# Patient Record
Sex: MALE | Race: WHITE | NOT HISPANIC OR LATINO | Employment: OTHER | URBAN - METROPOLITAN AREA
[De-identification: names, ages, dates, MRNs, and addresses within clinical notes are randomized per-mention and may not be internally consistent; named-entity substitution may affect disease eponyms.]

---

## 2017-12-05 NOTE — PATIENT DISCUSSION
Recommended artificial tears to use: 1 drop 4x-6x a day in both eyes, Refresh Optive recommended brand.

## 2017-12-05 NOTE — PATIENT DISCUSSION
"Patient is to try [facility] 128 ointment (or am equivalent) at bedtime every night, use a 1/4"" ribbon. "

## 2017-12-19 NOTE — PATIENT DISCUSSION
"Patient is to try [facility] 128 ointment (or am equivalent) at bedtime every other night, use a 1/4"" ribbon. "

## 2017-12-19 NOTE — PATIENT DISCUSSION
Recommended artificial tears to use: 1 drop 4x a day in both eyes as needed, Refresh Optive recommended brand. Advised to take Omega 3 fatty acids, Flaxseed Oil, in supplements. 2-4 grams a day. Recommended warm compresses 10 minutes once a day.

## 2018-04-19 ENCOUNTER — ESTABLISHED COMPREHENSIVE EXAM (OUTPATIENT)
Dept: URBAN - METROPOLITAN AREA CLINIC 43 | Facility: CLINIC | Age: 68
End: 2018-04-19

## 2018-04-19 DIAGNOSIS — H25.89: ICD-10-CM

## 2018-04-19 PROCEDURE — 92014 COMPRE OPH EXAM EST PT 1/>: CPT

## 2018-04-19 PROCEDURE — G8785 BP SCRN NO PERF AT INTERVAL: HCPCS

## 2018-04-19 PROCEDURE — 92015 DETERMINE REFRACTIVE STATE: CPT

## 2018-04-19 PROCEDURE — 1036F TOBACCO NON-USER: CPT

## 2018-04-19 PROCEDURE — G8427 DOCREV CUR MEDS BY ELIG CLIN: HCPCS

## 2018-04-19 ASSESSMENT — TONOMETRY
OS_IOP_MMHG: 13
OD_IOP_MMHG: 15

## 2018-04-19 ASSESSMENT — VISUAL ACUITY
OS_SC: 20/60
OS_PH: 20/50+2
OD_PH: 20/40-2
OD_CC: J2
OD_SC: 20/60
OS_CC: J2
OS_BAT: 20/200
OS_SC: J12
OD_SC: J12
OD_BAT: 20/100

## 2018-07-12 NOTE — PATIENT DISCUSSION
"Patient is to try [facility] 128 ointment (or am equivalent) at bedtime as needed, use a 1/4"" ribbon. "

## 2018-07-18 NOTE — PATIENT DISCUSSION
"Patient is to try [facility] 128 ointment (or am equivalent) at bedtime as needed, use a 1/4"" ribbon. "

## 2018-07-18 NOTE — PROCEDURE NOTE: SURGICAL
Prior to commencing surgery patient identification, surgical procedure, site, and side were confirmed by Dr. Velvet Hall. Following topical proparacaine anesthesia, the patient was positioned at the YAG laser, a contact lens coupled to the cornea with methylcellulose and an axial posterior capsulotomy performed without complication using 2.8 Mj x 33. Excess methylcellulose was washed from the eye, one drop of Alphagan was instilled and the patient returned to the holding area having tolerated the procedure well and without complication. <br />[facility]:[id number]

## 2018-07-18 NOTE — PATIENT DISCUSSION
"Patient is to try [facility] 128 ointment (or am equivalent) at bedtime as needed, use a 1/4"" ribbon. "

## 2018-07-24 ENCOUNTER — CATARACT CONSULT (OUTPATIENT)
Dept: URBAN - METROPOLITAN AREA CLINIC 43 | Facility: CLINIC | Age: 68
End: 2018-07-24

## 2018-07-24 VITALS
SYSTOLIC BLOOD PRESSURE: 143 MMHG | HEIGHT: 60 IN | HEART RATE: 66 BPM | DIASTOLIC BLOOD PRESSURE: 87 MMHG | RESPIRATION RATE: 17 BRPM

## 2018-07-24 DIAGNOSIS — H04.123: ICD-10-CM

## 2018-07-24 DIAGNOSIS — H43.813: ICD-10-CM

## 2018-07-24 DIAGNOSIS — H25.812: ICD-10-CM

## 2018-07-24 DIAGNOSIS — H18.51: ICD-10-CM

## 2018-07-24 DIAGNOSIS — H25.811: ICD-10-CM

## 2018-07-24 PROCEDURE — G8427 DOCREV CUR MEDS BY ELIG CLIN: HCPCS

## 2018-07-24 PROCEDURE — G8952 PRE-HTN/HTN, NO F/U, NOT GVN: HCPCS

## 2018-07-24 PROCEDURE — 99214 OFFICE O/P EST MOD 30 MIN: CPT

## 2018-07-24 PROCEDURE — 1036F TOBACCO NON-USER: CPT

## 2018-07-24 PROCEDURE — 92136TC INTERFEROMETRY - TECHNICAL COMPONENT

## 2018-07-24 PROCEDURE — 4040F PNEUMOC VAC/ADMIN/RCVD: CPT

## 2018-07-24 PROCEDURE — 92134 CPTRZ OPH DX IMG PST SGM RTA: CPT

## 2018-07-24 PROCEDURE — 92025-2 CORNEAL TOPOGRAPHY, PT

## 2018-07-24 PROCEDURE — 92286 ANT SGM IMG I&R SPECLR MIC: CPT

## 2018-07-24 RX ORDER — DUREZOL 0.5 MG/ML: 1 EMULSION OPHTHALMIC TWICE A DAY

## 2018-07-24 RX ORDER — NEPAFENAC 3 MG/ML: 1 SUSPENSION/ DROPS OPHTHALMIC ONCE A DAY

## 2018-07-24 RX ORDER — MOXIFLOXACIN HYDROCHLORIDE 5 MG/ML: 1 SOLUTION/ DROPS OPHTHALMIC

## 2018-07-24 ASSESSMENT — VISUAL ACUITY
OD_CC: J1 OTC
OD_PAM: 20/20
OD_SC: J12
OD_BAT: 20/100
OD_SC: 20/50-1
OS_SC: J12
OS_SC: 20/50-1
OS_AM: 20/20
OS_BAT: 20/200
OS_CC: J2 OTC

## 2018-07-24 ASSESSMENT — TONOMETRY
OS_IOP_MMHG: 12
OD_IOP_MMHG: 14

## 2018-07-25 NOTE — PATIENT DISCUSSION
"Patient is to try [facility] 128 ointment (or am equivalent) at bedtime as needed, use a 1/4"" ribbon. "

## 2018-08-08 ENCOUNTER — SURGERY/PROCEDURE (OUTPATIENT)
Dept: URBAN - METROPOLITAN AREA CLINIC 43 | Facility: CLINIC | Age: 68
End: 2018-08-08

## 2018-08-08 DIAGNOSIS — H25.811: ICD-10-CM

## 2018-08-08 PROCEDURE — 66984AV REMOVE CATARACT, INSERT ADVANCED LENS

## 2018-08-09 ENCOUNTER — POST OP/EVAL OF SECOND EYE (OUTPATIENT)
Dept: URBAN - METROPOLITAN AREA CLINIC 43 | Facility: CLINIC | Age: 68
End: 2018-08-09

## 2018-08-09 DIAGNOSIS — H25.812: ICD-10-CM

## 2018-08-09 DIAGNOSIS — H04.123: ICD-10-CM

## 2018-08-09 DIAGNOSIS — Z96.1: ICD-10-CM

## 2018-08-09 PROCEDURE — G8785 BP SCRN NO PERF AT INTERVAL: HCPCS

## 2018-08-09 PROCEDURE — 4040F PNEUMOC VAC/ADMIN/RCVD: CPT

## 2018-08-09 PROCEDURE — 99024 POSTOP FOLLOW-UP VISIT: CPT

## 2018-08-09 PROCEDURE — G9903 PT SCRN TBCO ID AS NON USER: HCPCS

## 2018-08-09 PROCEDURE — 92012 INTRM OPH EXAM EST PATIENT: CPT

## 2018-08-09 PROCEDURE — G8483 FLU IMM NO ADMIN DOC REA: HCPCS

## 2018-08-09 PROCEDURE — G8428 CUR MEDS NOT DOCUMENT: HCPCS

## 2018-08-09 PROCEDURE — 1036F TOBACCO NON-USER: CPT

## 2018-08-09 ASSESSMENT — TONOMETRY
OS_IOP_MMHG: 18
OD_IOP_MMHG: 14

## 2018-08-09 ASSESSMENT — VISUAL ACUITY
OS_BAT: 20/200
OS_SC: 20/50
OD_SC: J5
OD_SC: 20/30-2

## 2018-08-15 ENCOUNTER — SURGERY/PROCEDURE (OUTPATIENT)
Dept: URBAN - METROPOLITAN AREA CLINIC 43 | Facility: CLINIC | Age: 68
End: 2018-08-15

## 2018-08-15 ENCOUNTER — TECH ONLY (OUTPATIENT)
Dept: URBAN - METROPOLITAN AREA CLINIC 39 | Facility: CLINIC | Age: 68
End: 2018-08-15

## 2018-08-15 DIAGNOSIS — H25.812: ICD-10-CM

## 2018-08-15 PROCEDURE — 65772LRI LRI DURING CAT SX

## 2018-08-15 PROCEDURE — 66984AV REMOVE CATARACT, INSERT ADVANCED LENS

## 2018-08-15 PROCEDURE — 66999LNSR LENSAR LASER FOR CAT SX

## 2018-08-15 ASSESSMENT — VISUAL ACUITY
OS_SC: 20/50-2
OS_SC: J12
OD_SC: 20/25-2

## 2018-08-15 ASSESSMENT — TONOMETRY
OS_IOP_MMHG: 12
OD_IOP_MMHG: 14

## 2018-08-16 ENCOUNTER — CATARACT POST-OP 1-DAY (OUTPATIENT)
Dept: URBAN - METROPOLITAN AREA CLINIC 43 | Facility: CLINIC | Age: 68
End: 2018-08-16

## 2018-08-16 DIAGNOSIS — Z96.1: ICD-10-CM

## 2018-08-16 PROCEDURE — 99024 POSTOP FOLLOW-UP VISIT: CPT

## 2018-08-16 ASSESSMENT — TONOMETRY
OD_IOP_MMHG: 17
OS_IOP_MMHG: 15

## 2018-08-16 ASSESSMENT — VISUAL ACUITY
OD_SC: 20/40+1
OS_PH: 20/40-1
OS_SC: 20/100-1
OS_SC: J12
OD_SC: J12

## 2018-08-31 ENCOUNTER — POST-OP (OUTPATIENT)
Dept: URBAN - METROPOLITAN AREA CLINIC 43 | Facility: CLINIC | Age: 68
End: 2018-08-31

## 2018-08-31 DIAGNOSIS — Z96.1: ICD-10-CM

## 2018-08-31 PROCEDURE — 99024 POSTOP FOLLOW-UP VISIT: CPT

## 2018-08-31 ASSESSMENT — VISUAL ACUITY
OD_SC: 20/30-1
OD_SC: J10
OS_SC: J4
OD_BAT: 20/70
OS_BAT: 20/70
OS_SC: 20/30-2

## 2018-08-31 ASSESSMENT — TONOMETRY
OS_IOP_MMHG: 14
OD_IOP_MMHG: 14

## 2018-12-03 ENCOUNTER — ESTABLISHED COMPREHENSIVE EXAM (OUTPATIENT)
Dept: URBAN - METROPOLITAN AREA CLINIC 43 | Facility: CLINIC | Age: 68
End: 2018-12-03

## 2018-12-03 DIAGNOSIS — H43.813: ICD-10-CM

## 2018-12-03 DIAGNOSIS — H25.89: ICD-10-CM

## 2018-12-03 DIAGNOSIS — H26.493: ICD-10-CM

## 2018-12-03 DIAGNOSIS — H04.123: ICD-10-CM

## 2018-12-03 PROCEDURE — G9903 PT SCRN TBCO ID AS NON USER: HCPCS

## 2018-12-03 PROCEDURE — 92014 COMPRE OPH EXAM EST PT 1/>: CPT

## 2018-12-03 PROCEDURE — G8785 BP SCRN NO PERF AT INTERVAL: HCPCS

## 2018-12-03 PROCEDURE — 92015 DETERMINE REFRACTIVE STATE: CPT

## 2018-12-03 PROCEDURE — G8427 DOCREV CUR MEDS BY ELIG CLIN: HCPCS

## 2018-12-03 PROCEDURE — 1036F TOBACCO NON-USER: CPT

## 2018-12-03 ASSESSMENT — VISUAL ACUITY
OD_BAT: 20/400
OS_SC: 20/40
OS_SC: J3
OD_SC: 20/30-1
OD_SC: J6-
OS_BAT: 20/400
OD_CC: J2
OS_CC: J2+

## 2018-12-03 ASSESSMENT — TONOMETRY
OD_IOP_MMHG: 8
OS_IOP_MMHG: 9

## 2019-01-08 ENCOUNTER — CONSULT (OUTPATIENT)
Dept: URBAN - METROPOLITAN AREA CLINIC 43 | Facility: CLINIC | Age: 69
End: 2019-01-08

## 2019-01-08 DIAGNOSIS — H26.493: ICD-10-CM

## 2019-01-08 PROCEDURE — 99024 POSTOP FOLLOW-UP VISIT: CPT

## 2019-01-08 ASSESSMENT — VISUAL ACUITY
OD_BAT: 20/60
OS_SC: 20/70-1
OD_SC: 20/40-2
OS_SC: J10 @ 22"
OU_SC: 20/40-2
OS_BAT: 20/80

## 2019-01-08 ASSESSMENT — TONOMETRY
OD_IOP_MMHG: 14
OS_IOP_MMHG: 15

## 2019-01-24 ENCOUNTER — SURGERY/PROCEDURE (OUTPATIENT)
Dept: URBAN - METROPOLITAN AREA SURGERY 14 | Facility: SURGERY | Age: 69
End: 2019-01-24

## 2019-01-24 ENCOUNTER — PRE-OP/H&P (OUTPATIENT)
Dept: URBAN - METROPOLITAN AREA SURGERY 14 | Facility: SURGERY | Age: 69
End: 2019-01-24

## 2019-01-24 VITALS
HEART RATE: 84 BPM | DIASTOLIC BLOOD PRESSURE: 76 MMHG | SYSTOLIC BLOOD PRESSURE: 107 MMHG | HEIGHT: 60 IN | RESPIRATION RATE: 16 BRPM

## 2019-01-24 DIAGNOSIS — H26.493: ICD-10-CM

## 2019-01-24 DIAGNOSIS — H52.7: ICD-10-CM

## 2019-01-24 PROCEDURE — G8418 CALC BMI BLW LOW PARAM F/U: HCPCS

## 2019-01-24 PROCEDURE — 4040F PNEUMOC VAC/ADMIN/RCVD: CPT

## 2019-01-24 PROCEDURE — 1036F TOBACCO NON-USER: CPT

## 2019-01-24 PROCEDURE — G8483 FLU IMM NO ADMIN DOC REA: HCPCS

## 2019-01-24 PROCEDURE — 6682150 YAG CAPSULOTOMY

## 2019-01-24 PROCEDURE — G8427 DOCREV CUR MEDS BY ELIG CLIN: HCPCS

## 2019-01-24 PROCEDURE — 99211T TECH SERVICE

## 2019-01-24 PROCEDURE — G9903 PT SCRN TBCO ID AS NON USER: HCPCS

## 2019-01-24 PROCEDURE — G8783 BP SCRN PERF REC INTERVAL: HCPCS

## 2019-02-12 ENCOUNTER — YAG POST-OP (OUTPATIENT)
Dept: URBAN - METROPOLITAN AREA CLINIC 43 | Facility: CLINIC | Age: 69
End: 2019-02-12

## 2019-02-12 DIAGNOSIS — H52.7: ICD-10-CM

## 2019-02-12 DIAGNOSIS — Z98.890: ICD-10-CM

## 2019-02-12 PROCEDURE — 99024 POSTOP FOLLOW-UP VISIT: CPT

## 2019-02-12 ASSESSMENT — VISUAL ACUITY
OS_SC: J3
OD_SC: J6
OD_SC: 20/30-2
OS_PH: 20/40
OS_SC: 20/70+2

## 2019-02-12 ASSESSMENT — TONOMETRY
OD_IOP_MMHG: 16
OS_IOP_MMHG: 14

## 2019-02-26 ENCOUNTER — POST-OP (OUTPATIENT)
Dept: URBAN - METROPOLITAN AREA CLINIC 43 | Facility: CLINIC | Age: 69
End: 2019-02-26

## 2019-02-26 DIAGNOSIS — Z96.1: ICD-10-CM

## 2019-02-26 PROCEDURE — 99024 POSTOP FOLLOW-UP VISIT: CPT

## 2019-02-26 PROCEDURE — 92025 CPTRIZED CORNEAL TOPOGRAPHY: CPT

## 2019-02-26 ASSESSMENT — VISUAL ACUITY
OS_SC: J2
OD_CC: J1
OU_SC: 20/30+1
OS_SC: 20/40-2
OD_SC: 20/30-2
OS_CC: J1
OU_CC: J1
OU_SC: J2

## 2019-04-23 ENCOUNTER — REFRACTION ONLY (OUTPATIENT)
Dept: URBAN - METROPOLITAN AREA CLINIC 43 | Facility: CLINIC | Age: 69
End: 2019-04-23

## 2019-04-23 DIAGNOSIS — H52.7: ICD-10-CM

## 2019-04-23 PROCEDURE — 99024 POSTOP FOLLOW-UP VISIT: CPT

## 2019-04-23 ASSESSMENT — VISUAL ACUITY
OS_SC: 20/50-1
OU_SC: J3
OD_SC: J6
OS_SC: J4
OD_SC: 20/30-2

## 2019-04-25 ENCOUNTER — SURGERY/PROCEDURE (OUTPATIENT)
Dept: URBAN - METROPOLITAN AREA CLINIC 39 | Facility: CLINIC | Age: 69
End: 2019-04-25

## 2019-04-25 DIAGNOSIS — H52.7: ICD-10-CM

## 2019-04-25 PROCEDURE — 66999ISPP INTRALASE LASIK S/P ADVANCED / CUSTOM VISION < 12 MONTHS

## 2019-04-26 ENCOUNTER — 1 DAY LASIK POST OP (OUTPATIENT)
Dept: URBAN - METROPOLITAN AREA CLINIC 43 | Facility: CLINIC | Age: 69
End: 2019-04-26

## 2019-04-26 DIAGNOSIS — Z98.890: ICD-10-CM

## 2019-04-26 PROCEDURE — 99024 POSTOP FOLLOW-UP VISIT: CPT

## 2019-04-26 ASSESSMENT — VISUAL ACUITY
OS_SC: 20/40-2
OD_SC: J4
OD_SC: 20/25-3
OS_SC: J5

## 2019-05-16 ENCOUNTER — POST-OP (OUTPATIENT)
Dept: URBAN - METROPOLITAN AREA CLINIC 43 | Facility: CLINIC | Age: 69
End: 2019-05-16

## 2019-05-16 DIAGNOSIS — Z98.890: ICD-10-CM

## 2019-05-16 PROCEDURE — 99024 POSTOP FOLLOW-UP VISIT: CPT

## 2019-05-16 ASSESSMENT — VISUAL ACUITY
OD_SC: J3
OD_SC: 20/20
OS_SC: 20/25-3
OS_SC: J2

## 2019-07-29 NOTE — PATIENT DISCUSSION
"Patient is to try [facility] 128 ointment (or am equivalent) at bedtime as needed, use a 1/4"" ribbon. "

## 2019-07-29 NOTE — PATIENT DISCUSSION
Try Chanda ointment at bedtime for 2 weeks, then try lubricant drops 6 times a day for 2 weeks, if either helps with blurry vision continue. If lubricant drops help, try 2 month trial of 3,000mg flaxseed oil with abrupt stop at the end to see if it helps. Warm compress on closed eyelids for 10 minutes once a day during flaxseed oil trial will help. If right eye improves but left does not consider YAG laser.

## 2020-12-10 ENCOUNTER — ESTABLISHED COMPREHENSIVE EXAM (OUTPATIENT)
Dept: URBAN - METROPOLITAN AREA CLINIC 43 | Facility: CLINIC | Age: 70
End: 2020-12-10

## 2020-12-10 DIAGNOSIS — H43.813: ICD-10-CM

## 2020-12-10 DIAGNOSIS — H25.89: ICD-10-CM

## 2020-12-10 DIAGNOSIS — Z96.1: ICD-10-CM

## 2020-12-10 DIAGNOSIS — H04.123: ICD-10-CM

## 2020-12-10 DIAGNOSIS — H18.513: ICD-10-CM

## 2020-12-10 PROCEDURE — 92014 COMPRE OPH EXAM EST PT 1/>: CPT

## 2020-12-10 PROCEDURE — 92015 DETERMINE REFRACTIVE STATE: CPT

## 2020-12-10 ASSESSMENT — VISUAL ACUITY
OD_SC: J3-
OD_SC: 20/25-1
OS_SC: 20/30-1
OS_SC: J2-

## 2020-12-10 ASSESSMENT — TONOMETRY
OS_IOP_MMHG: 9
OD_IOP_MMHG: 9

## 2021-07-21 ENCOUNTER — OFFICE VISIT (OUTPATIENT)
Dept: URGENT CARE | Facility: URGENT CARE | Age: 71
End: 2021-07-21
Payer: MEDICARE

## 2021-07-21 VITALS
TEMPERATURE: 97.5 F | HEIGHT: 70 IN | RESPIRATION RATE: 16 BRPM | DIASTOLIC BLOOD PRESSURE: 93 MMHG | SYSTOLIC BLOOD PRESSURE: 156 MMHG | OXYGEN SATURATION: 95 % | BODY MASS INDEX: 29.28 KG/M2 | WEIGHT: 204.5 LBS | HEART RATE: 71 BPM

## 2021-07-21 DIAGNOSIS — J06.9 VIRAL URI WITH COUGH: Primary | ICD-10-CM

## 2021-07-21 PROCEDURE — G0463 HOSPITAL OUTPT CLINIC VISIT: HCPCS | Mod: PO | Performed by: PHYSICIAN ASSISTANT

## 2021-07-21 PROCEDURE — 99203 OFFICE O/P NEW LOW 30 MIN: CPT | Performed by: PHYSICIAN ASSISTANT

## 2021-07-21 RX ORDER — ALBUTEROL SULFATE 90 UG/1
2 INHALANT RESPIRATORY (INHALATION) EVERY 6 HOURS PRN
Qty: 1 INHALER | Refills: 0 | Status: SHIPPED
Start: 2021-07-21 | End: 2021-07-21

## 2021-07-21 RX ORDER — LORATADINE 10 MG/1
10 TABLET ORAL DAILY
COMMUNITY

## 2021-07-21 RX ORDER — MELOXICAM 15 MG/1
TABLET ORAL
COMMUNITY
Start: 2021-02-23

## 2021-07-21 RX ORDER — BENZONATATE 100 MG/1
100 CAPSULE ORAL 3 TIMES DAILY PRN
Qty: 21 CAPSULE | Refills: 0 | Status: SHIPPED
Start: 2021-07-21 | End: 2021-07-21

## 2021-07-21 RX ORDER — BENZONATATE 100 MG/1
100 CAPSULE ORAL 3 TIMES DAILY PRN
Qty: 21 CAPSULE | Refills: 0 | Status: SHIPPED | OUTPATIENT
Start: 2021-07-21 | End: 2021-07-28

## 2021-07-21 RX ORDER — ALBUTEROL SULFATE 90 UG/1
2 INHALANT RESPIRATORY (INHALATION) EVERY 6 HOURS PRN
Qty: 1 INHALER | Refills: 0 | Status: SHIPPED | OUTPATIENT
Start: 2021-07-21

## 2021-07-21 ASSESSMENT — PAIN SCALES - GENERAL: PAINLEVEL: 0-NO PAIN

## 2021-07-21 NOTE — PATIENT INSTRUCTIONS
Plan/Patient Instructions (Adult):   · You are most likely experiencing an upper respiratory infection (URI) caused by a virus. Antibiotics are not effective against viruses.   · Viral URIs are self-limiting, generally lasting 7-10 days in healthy adults.   · A post-infectious cough may linger up to one month after your infection clears.   · Supportive care for your illness includes: maintaining adequate hydration, drinking warm fluids, honey (1 tsp) for cough, nasal saline, cool mist humidification, and rest.   · Acetaminophen or ibuprofen may be helpful for discomfort. Dextromethorphan may be used for cough. Decongestants may offer mild relief of nasal congestion.   · You may wish to avoid use of combination cold remedies. If you choose these products, please be aware of their ingredients and avoid duplicate therapy.   · Please engage in good cough and hand-hygiene to prevent the spread of infection.   · You may attend work with a viral URI as long as you are not febrile.   · Please follow-up with your primary care provider or Urgent Care should your symptoms worsen or not improve in the above-designated timeframe.     References:   Jose et al., 2012   Gertrude & Chang [UpToDate], 2017

## 2021-07-21 NOTE — PROGRESS NOTES
Subjective      Jose G Perry is a 70 y.o. male who presents for cough.  HPI     Patient presents for evaluation of cough that has been present for the last 5 days.  Reports sputum production that is yellow or green in color.  Also has nasal drainage and chest congestion.  Woke up with a headache this morning.  No fever or chills. has tried Mucinex but has not helped much.  Has been vaccinated against COVID-19.  Denies close contacts with COVID-19.    The following have been reviewed and updated as appropriate in this visit:         No Known Allergies  Current Outpatient Medications   Medication Sig Dispense Refill   • meloxicam (Mobic) 15 mg tablet meloxicam 15 MG Oral Tablet [Mobic]  Take 1 tablet by mouth once a day .   Start: 23-Feb-2021     • loratadine (CLARITIN) 10 mg tablet Take 10 mg by mouth daily     • benzonatate (TESSALON) 100 mg capsule Take 1 capsule (100 mg total) by mouth 3 (three) times a day as needed for cough for up to 7 days 21 capsule 0   • albuterol HFA (PROVENTIL HFA;VENTOLIN HFA) 90 mcg/actuation inhaler Inhale 2 puffs every 6 (six) hours as needed for wheezing 1 Inhaler 0     No current facility-administered medications for this visit.     History reviewed. No pertinent past medical history.  Past Surgical History:   Procedure Laterality Date   • BACK SURGERY       Family History   Problem Relation Age of Onset   • Diabetes Mother    • Atrial fibrillation Mother    • No Known Problems Father      Social History     Occupational History   • Not on file   Tobacco Use   • Smoking status: Never Smoker   • Smokeless tobacco: Never Used   Substance and Sexual Activity   • Alcohol use: Not on file   • Drug use: Not on file   • Sexual activity: Not on file   Social History Narrative   • Not on file       Review of Systems  See HPI    Objective     Vitals:  /93 (BP Location: Left arm, Patient Position: Sitting, Cuff Size: Long Adult)   Pulse 71   Temp 36.4 °C (97.5 °F) (Temporal)   Resp 16   " Ht 1.778 m (5' 10\")   Wt 92.8 kg (204 lb 8 oz)   SpO2 95%   BMI 29.34 kg/m²     Physical Exam  Constitutional:       General: He is not in acute distress.     Appearance: Normal appearance.   HENT:      Right Ear: No tenderness. No middle ear effusion. There is no impacted cerumen. Tympanic membrane is not erythematous or bulging.      Left Ear: No tenderness.  No middle ear effusion. There is no impacted cerumen. Tympanic membrane is not erythematous or bulging.      Nose: Congestion present. No nasal tenderness, mucosal edema or rhinorrhea.      Right Turbinates: Not swollen.      Left Turbinates: Not swollen.      Mouth/Throat:      Mouth: Mucous membranes are moist. No oral lesions.      Pharynx: Posterior oropharyngeal erythema present. No oropharyngeal exudate.      Tonsils: No tonsillar exudate.   Cardiovascular:      Rate and Rhythm: Normal rate and regular rhythm.      Heart sounds: Normal heart sounds.   Pulmonary:      Effort: Pulmonary effort is normal.      Breath sounds: Examination of the right-upper field reveals wheezing. Examination of the left-upper field reveals wheezing. Wheezing present.   Lymphadenopathy:      Cervical: No cervical adenopathy.      Right cervical: No superficial or posterior cervical adenopathy.     Left cervical: No superficial or posterior cervical adenopathy.   Neurological:      Mental Status: He is alert.         Assessment/Plan   Diagnoses and all orders for this visit:    Viral URI with cough  -     benzonatate (TESSALON) 100 mg capsule; Take 1 capsule (100 mg total) by mouth 3 (three) times a day as needed for cough for up to 7 days  -     albuterol HFA (PROVENTIL HFA;VENTOLIN HFA) 90 mcg/actuation inhaler; Inhale 2 puffs every 6 (six) hours as needed for wheezing    Clinical picture is most suggestive of a diagnosis of viral upper respiratory infection.   Etiology, self-care, and follow-up were reviewed with the patient/caregiver as described " below:    URI:  1.Discussed with patient that clinical picture is most suggestive of a viral upper respiratory infection.  This condition is generally self limiting and symptoms should improve in 7-10 days  2.Provided instructions on symptomatic management  · maintaining adequate hydration, drinking warm fluids, honey (1 tsp) for cough, nasal saline rinses, cool mist humidification, and rest.   · Acetaminophen or ibuprofen may be helpful for discomfort. Dextromethorphan may be used for cough. Decongestants may offer mild relief of nasal congestion.   · Can use of combination cold remedies.Advised to be aware of their ingredients and avoid duplicate therapy.     3.Did advise if symptoms worsen or do not improve in 7 to 10 days she should follow-up with her primary care provider or urgent care for further evaluation.  4.  Patient will take Tessalon Perles as prescribed above  5.Patient will take albuterol inhaler as prescribed above.      Patient voices understanding and is in agreement with plan.      Dasia Watts PA-C    A voice recognition program was used to aid in documentation of this record. Sometimes words are not printed exactly as they were spoken.  While efforts were made to carefully edit and correct any inaccuracies, some areas may be present; please take these into context.  Please contact the provider if areas are identified.

## 2021-09-29 NOTE — PROCEDURE NOTE: SURGICAL
<p>Prior to commencing surgery patient identification, surgical procedure, site, and side were confirmed by Dr. Annelise Miranda. Following topical proparacaine anesthesia, the patient was positioned at the YAG laser, a contact lens coupled to the cornea with methylcellulose and an axial posterior capsulotomy performed without complication using 2.8 Mj x 60. Excess methylcellulose was washed from the eye, one drop of Alphagan was instilled and the patient returned to the holding area having tolerated the procedure well and without complication. </p><p>MRN: [id number]</p><p>&nbsp;</p>

## 2021-12-09 ENCOUNTER — COMPREHENSIVE EXAM (OUTPATIENT)
Dept: URBAN - METROPOLITAN AREA CLINIC 43 | Facility: CLINIC | Age: 71
End: 2021-12-09

## 2021-12-09 DIAGNOSIS — H43.813: ICD-10-CM

## 2021-12-09 DIAGNOSIS — H04.123: ICD-10-CM

## 2021-12-09 DIAGNOSIS — H25.89: ICD-10-CM

## 2021-12-09 DIAGNOSIS — Z96.1: ICD-10-CM

## 2021-12-09 PROCEDURE — 92014 COMPRE OPH EXAM EST PT 1/>: CPT

## 2021-12-09 PROCEDURE — 92015 DETERMINE REFRACTIVE STATE: CPT

## 2021-12-09 ASSESSMENT — VISUAL ACUITY
OD_SC: J3-
OD_SC: 20/30-1
OS_SC: 20/40-1+1
OS_SC: J3

## 2021-12-09 ASSESSMENT — TONOMETRY
OD_IOP_MMHG: 9
OS_IOP_MMHG: 10

## 2022-03-03 NOTE — PATIENT DISCUSSION
Removed plug in meibomian gland with cotton swab today, patient is to use microwavable warm compress on both eyes for 10 minutes once or twice a day and use her magnified make up mirror to monitor left lower lid and if the area refills see Allison Minaya for biopsy/removal of cyst.

## 2022-08-26 NOTE — PATIENT DISCUSSION
Use microwavable warm compress on both eyes for 10 minutes once or twice a day and use her magnified make up mirror to monitor left lower lid and if the area refills see Fredda Agent for biopsy/removal of cyst.

## 2022-11-07 ENCOUNTER — COMPREHENSIVE EXAM (OUTPATIENT)
Dept: URBAN - METROPOLITAN AREA CLINIC 43 | Facility: CLINIC | Age: 72
End: 2022-11-07

## 2022-11-07 DIAGNOSIS — Z96.1: ICD-10-CM

## 2022-11-07 DIAGNOSIS — H04.123: ICD-10-CM

## 2022-11-07 DIAGNOSIS — H25.89: ICD-10-CM

## 2022-11-07 DIAGNOSIS — H43.813: ICD-10-CM

## 2022-11-07 DIAGNOSIS — H18.513: ICD-10-CM

## 2022-11-07 PROCEDURE — 92015 DETERMINE REFRACTIVE STATE: CPT

## 2022-11-07 PROCEDURE — 92014 COMPRE OPH EXAM EST PT 1/>: CPT

## 2022-11-07 ASSESSMENT — VISUAL ACUITY
OS_SC: J3
OS_SC: 20/30-2+2
OD_SC: 20/20-1
OD_SC: J3-

## 2022-11-07 ASSESSMENT — TONOMETRY
OS_IOP_MMHG: 11
OD_IOP_MMHG: 12

## 2022-11-16 ENCOUNTER — APPOINTMENT (RX ONLY)
Dept: URBAN - METROPOLITAN AREA CLINIC 137 | Facility: CLINIC | Age: 72
Setting detail: DERMATOLOGY
End: 2022-11-16

## 2022-11-16 DIAGNOSIS — D18.0 HEMANGIOMA: ICD-10-CM | Status: UNCHANGED

## 2022-11-16 DIAGNOSIS — L81.4 OTHER MELANIN HYPERPIGMENTATION: ICD-10-CM

## 2022-11-16 DIAGNOSIS — L57.0 ACTINIC KERATOSIS: ICD-10-CM

## 2022-11-16 DIAGNOSIS — L57.8 OTHER SKIN CHANGES DUE TO CHRONIC EXPOSURE TO NONIONIZING RADIATION: ICD-10-CM | Status: UNCHANGED

## 2022-11-16 DIAGNOSIS — Z71.89 OTHER SPECIFIED COUNSELING: ICD-10-CM

## 2022-11-16 DIAGNOSIS — L82.1 OTHER SEBORRHEIC KERATOSIS: ICD-10-CM | Status: UNCHANGED

## 2022-11-16 PROBLEM — D18.01 HEMANGIOMA OF SKIN AND SUBCUTANEOUS TISSUE: Status: ACTIVE | Noted: 2022-11-16

## 2022-11-16 PROCEDURE — 17003 DESTRUCT PREMALG LES 2-14: CPT

## 2022-11-16 PROCEDURE — ? LIQUID NITROGEN

## 2022-11-16 PROCEDURE — 99213 OFFICE O/P EST LOW 20 MIN: CPT | Mod: 25

## 2022-11-16 PROCEDURE — 17000 DESTRUCT PREMALG LESION: CPT

## 2022-11-16 PROCEDURE — ? COUNSELING

## 2022-11-16 PROCEDURE — ? SUNSCREEN RECOMMENDATIONS

## 2022-11-16 ASSESSMENT — LOCATION SIMPLE DESCRIPTION DERM
LOCATION SIMPLE: RIGHT FOREARM
LOCATION SIMPLE: CHEST
LOCATION SIMPLE: LEFT FOREHEAD
LOCATION SIMPLE: ABDOMEN
LOCATION SIMPLE: LEFT PRETIBIAL REGION
LOCATION SIMPLE: RIGHT UPPER BACK
LOCATION SIMPLE: RIGHT PRETIBIAL REGION
LOCATION SIMPLE: NECK
LOCATION SIMPLE: POSTERIOR NECK
LOCATION SIMPLE: UPPER BACK
LOCATION SIMPLE: LEFT FOREARM

## 2022-11-16 ASSESSMENT — LOCATION DETAILED DESCRIPTION DERM
LOCATION DETAILED: INFERIOR THORACIC SPINE
LOCATION DETAILED: STERNUM
LOCATION DETAILED: LEFT SUPERIOR LATERAL NECK
LOCATION DETAILED: RIGHT SUPERIOR UPPER BACK
LOCATION DETAILED: LEFT DISTAL PRETIBIAL REGION
LOCATION DETAILED: LEFT SUPERIOR FOREHEAD
LOCATION DETAILED: RIGHT DISTAL DORSAL FOREARM
LOCATION DETAILED: RIGHT PROXIMAL PRETIBIAL REGION
LOCATION DETAILED: PERIUMBILICAL SKIN
LOCATION DETAILED: RIGHT INFERIOR POSTERIOR NECK
LOCATION DETAILED: LEFT SUPERIOR LATERAL FOREHEAD
LOCATION DETAILED: LEFT MEDIAL SUPERIOR CHEST
LOCATION DETAILED: LEFT PROXIMAL DORSAL FOREARM

## 2022-11-16 ASSESSMENT — LOCATION ZONE DERM
LOCATION ZONE: ARM
LOCATION ZONE: FACE
LOCATION ZONE: NECK
LOCATION ZONE: LEG
LOCATION ZONE: TRUNK

## 2022-11-16 NOTE — PROCEDURE: LIQUID NITROGEN
Render Note In Bullet Format When Appropriate: No
Consent: The patient's consent was obtained including but not limited to risks of crusting, scabbing, blistering, scarring, darker or lighter pigmentary change, recurrence, incomplete removal and infection.
Show Aperture Variable?: Yes
Detail Level: Detailed
Duration Of Freeze Thaw-Cycle (Seconds): 2
Post-Care Instructions: I reviewed with the patient in detail post-care instructions. Patient is to wear sunprotection, and avoid picking at any of the treated lesions. Pt may apply Vaseline to crusted or scabbing areas.

## 2022-11-16 NOTE — HPI: PREVENTATIVE SKIN CHECK
What Is The Reason For Today's Visit?: Full Body Skin Examination
Additional History: Areas of concern forehead and nose